# Patient Record
Sex: FEMALE | Race: WHITE | URBAN - METROPOLITAN AREA
[De-identification: names, ages, dates, MRNs, and addresses within clinical notes are randomized per-mention and may not be internally consistent; named-entity substitution may affect disease eponyms.]

---

## 2024-07-19 ENCOUNTER — OFFICE VISIT (OUTPATIENT)
Age: 59
End: 2024-07-19
Payer: COMMERCIAL

## 2024-07-19 VITALS
HEART RATE: 92 BPM | RESPIRATION RATE: 20 BRPM | TEMPERATURE: 98.7 F | HEIGHT: 66 IN | SYSTOLIC BLOOD PRESSURE: 141 MMHG | DIASTOLIC BLOOD PRESSURE: 96 MMHG | WEIGHT: 183.5 LBS | BODY MASS INDEX: 29.49 KG/M2

## 2024-07-19 DIAGNOSIS — Z12.31 SCREENING MAMMOGRAM FOR BREAST CANCER: ICD-10-CM

## 2024-07-19 DIAGNOSIS — Z11.59 NEED FOR HEPATITIS C SCREENING TEST: ICD-10-CM

## 2024-07-19 DIAGNOSIS — L82.1 SK (SEBORRHEIC KERATOSIS): ICD-10-CM

## 2024-07-19 DIAGNOSIS — R03.0 ELEVATED BLOOD PRESSURE READING IN OFFICE WITHOUT DIAGNOSIS OF HYPERTENSION: ICD-10-CM

## 2024-07-19 DIAGNOSIS — L91.8 SKIN TAG: ICD-10-CM

## 2024-07-19 DIAGNOSIS — Z00.00 ENCOUNTER FOR WELL ADULT EXAM WITHOUT ABNORMAL FINDINGS: Primary | ICD-10-CM

## 2024-07-19 DIAGNOSIS — Z78.9 NONSMOKER: ICD-10-CM

## 2024-07-19 DIAGNOSIS — Z12.11 ENCOUNTER FOR SCREENING COLONOSCOPY: ICD-10-CM

## 2024-07-19 DIAGNOSIS — Z11.4 SCREENING FOR HIV (HUMAN IMMUNODEFICIENCY VIRUS): ICD-10-CM

## 2024-07-19 PROCEDURE — 99212 OFFICE O/P EST SF 10 MIN: CPT | Performed by: FAMILY MEDICINE

## 2024-07-19 PROCEDURE — 99386 PREV VISIT NEW AGE 40-64: CPT | Performed by: FAMILY MEDICINE

## 2024-07-19 RX ORDER — M-VIT,TX,IRON,MINS/CALC/FOLIC 27MG-0.4MG
1 TABLET ORAL DAILY
COMMUNITY

## 2024-07-19 NOTE — PATIENT INSTRUCTIONS
Please have labs completed at earliest convenience.  It would be best if you could fast for 8 hours, but if you don't, no big deal.  Please schedule a pap.  You can have the skin tag removed when you desire, but you will likely need to pay out of pocket for it.  Mole is benign, non concerning, called a seborrheic keratosis.  Surgery referral for colonoscopy  Mammogram ordered.  Please try to eat a healthy, well balanced diet.  Try to eat foods along the perimeter of the grocery store, like fruit, veggies, whole grains, lean protein (chicken, fish, and turkey).  May consider increasing protein intake, and getting 2 servings of veggies with each meal.  Start with one small change at a time and continue to work on additional changes.  For example, start by making sure that you are getting 2 servings of vegetables at dinner most nights.  Then, possibly increase to 2 servings of vegetables at lunch and 2 at dinner.  If you need suggestions, please ask me!  There are recommendations for regular exercise.  The minimal amount of moderate aerobic activity each week is 150 minutes.  This means that your heart rate is getting up a moderate amount.  The activities can be anything you find enjoyable, but some examples are running/jogging, walking at a moderate pace, biking, dancing in your living room, reagan, roller skating, going up/down steps in your home, etc.  There is also a recommendation for strength training for at least 30 minutes 2 times per week.  This can be done at home with your own body weight or going to the gym, or using youtube videos.  Start with a small goal, like walking 15 minutes 5 days per week and building from there.  If you have questions, please ask me!    Main goal to work on is increasing protein and water and adding strength training 2 times per week for at least 20-30 min each session.  Then building from there.  Please follow-up blood pressure and pap.

## 2024-07-19 NOTE — PROGRESS NOTES
[facility]  [address] [place] [address]  Phone: [phone]  Fax: [phone]      CHIEF COMPLAINT:     Leonora Mello is a 58 y.o. female for an annual wellness exam.    HPI     Subjective:  She has been feeling fine and has had no new problems since last office visit. She does not see any other doctors. Used to see RAMA Mehta.     BP is 143/92. She has never been diagnosed with HTN. Denies headaches, CP, palpitations, SOB, vision changes.     She is concerned about a mole on her back, has been there a long time. Has started to change color and size a few months ago. She has had several moles removed in the past, all were benign. Denies family hx skin cancer. Does not wear sunscreen regularly.    She has a skin tag on her neck she is interested in having removed. Gets caught on necklaces.        REVIEWED INFORMATION    I personally reviewed the patient's past medical history, current medications, allergies, surgical history, family history and social history.  Updates were made as necessary.     Past Medical History:   Diagnosis Date    No pertinent past medical history      Past Surgical History:   Procedure Laterality Date    COLONOSCOPY  2014     Sulfa antibiotics  Social History     Tobacco Use    Smoking status: Never    Smokeless tobacco: Never   Substance Use Topics    Alcohol use: Not Currently     Comment: socially     Family History   Problem Relation Age of Onset    Pacemaker Mother     Stroke Father     High Cholesterol Father     High Blood Pressure Father     Breast Cancer Maternal Aunt        Gynecologic History:  Menarche: unsure of age    Menstrual history:    No LMP recorded. Menopausal for 11 years.     Sexually Active: Yes    Sexual dysfunction concerns: No     STI History: Yes      Permanent Sterilization: No   Reversible Birth Control: No        Hormone Replacement Exposure: No      Vasomotor and/or

## 2024-07-25 ENCOUNTER — HOSPITAL ENCOUNTER (OUTPATIENT)
Age: 59
Setting detail: SPECIMEN
Discharge: HOME | End: 2024-07-25

## 2024-07-25 DIAGNOSIS — Z11.4 SCREENING FOR HIV (HUMAN IMMUNODEFICIENCY VIRUS): ICD-10-CM

## 2024-07-25 DIAGNOSIS — Z00.00 ENCOUNTER FOR WELL ADULT EXAM WITHOUT ABNORMAL FINDINGS: ICD-10-CM

## 2024-07-25 DIAGNOSIS — Z11.59 NEED FOR HEPATITIS C SCREENING TEST: ICD-10-CM

## 2024-07-25 LAB
ALBUMIN SERPL-MCNC: 5 G/DL (ref 3.5–5.2)
ALBUMIN/GLOB SERPL: 2 {RATIO} (ref 1–2.5)
ALBUMIN: NORMAL
ALP BLD-CCNC: NORMAL U/L
ALP SERPL-CCNC: 93 U/L (ref 35–104)
ALT SERPL-CCNC: 16 U/L (ref 10–35)
ALT SERPL-CCNC: NORMAL U/L
ANION GAP SERPL CALCULATED.3IONS-SCNC: 11 MMOL/L (ref 9–16)
AST SERPL-CCNC: 15 U/L (ref 10–35)
AST SERPL-CCNC: NORMAL U/L
BASOPHILS # BLD: 0.04 K/UL (ref 0–0.2)
BASOPHILS NFR BLD: 1 % (ref 0–2)
BILIRUB SERPL-MCNC: 0.8 MG/DL (ref 0–1.2)
BILIRUB SERPL-MCNC: NORMAL MG/DL
BUN BLDV-MCNC: NORMAL MG/DL
BUN SERPL-MCNC: 19 MG/DL (ref 8–23)
CALCIUM SERPL-MCNC: 9.9 MG/DL (ref 8.6–10.4)
CALCIUM SERPL-MCNC: NORMAL MG/DL
CHLORIDE BLD-SCNC: NORMAL MMOL/L
CHLORIDE SERPL-SCNC: 102 MMOL/L (ref 98–107)
CHOLEST SERPL-MCNC: 249 MG/DL (ref 0–199)
CHOLESTEROL/HDL RATIO: 4
CO2 SERPL-SCNC: 26 MMOL/L (ref 20–31)
CO2: NORMAL
CREAT SERPL-MCNC: 0.9 MG/DL (ref 0.5–0.9)
CREAT SERPL-MCNC: NORMAL MG/DL
EOSINOPHIL # BLD: 0.05 K/UL (ref 0–0.44)
EOSINOPHILS RELATIVE PERCENT: 1 % (ref 1–4)
ERYTHROCYTE [DISTWIDTH] IN BLOOD BY AUTOMATED COUNT: 12.9 % (ref 11.8–14.4)
GFR, ESTIMATED: 77 ML/MIN/1.73M2
GLUCOSE BLD-MCNC: 105 MG/DL
GLUCOSE FASTING: 105 MG/DL
GLUCOSE SERPL-MCNC: 105 MG/DL (ref 74–99)
HCT VFR BLD AUTO: 42.1 % (ref 36.3–47.1)
HCV AB SERPL QL IA: NONREACTIVE
HDLC SERPL-MCNC: 57 MG/DL
HGB BLD-MCNC: 14 G/DL (ref 11.9–15.1)
HIV 1+2 AB+HIV1 P24 AG SERPL QL IA: NONREACTIVE
IMM GRANULOCYTES # BLD AUTO: <0.03 K/UL (ref 0–0.3)
IMM GRANULOCYTES NFR BLD: 0 %
LDLC SERPL CALC-MCNC: 176 MG/DL (ref 0–100)
LYMPHOCYTES NFR BLD: 1.9 K/UL (ref 1.1–3.7)
LYMPHOCYTES RELATIVE PERCENT: 34 % (ref 24–43)
MCH RBC QN AUTO: 32.5 PG (ref 25.2–33.5)
MCHC RBC AUTO-ENTMCNC: 33.3 G/DL (ref 28.4–34.8)
MCV RBC AUTO: 97.7 FL (ref 82.6–102.9)
MONOCYTES NFR BLD: 0.38 K/UL (ref 0.1–1.2)
MONOCYTES NFR BLD: 7 % (ref 3–12)
NEUTROPHILS NFR BLD: 57 % (ref 36–65)
NEUTS SEG NFR BLD: 3.23 K/UL (ref 1.5–8.1)
NRBC BLD-RTO: 0 PER 100 WBC
PLATELET # BLD AUTO: 284 K/UL (ref 138–453)
PMV BLD AUTO: 11.8 FL (ref 8.1–13.5)
POTASSIUM SERPL-SCNC: 4.6 MMOL/L (ref 3.7–5.3)
POTASSIUM SERPL-SCNC: NORMAL MMOL/L
PROT SERPL-MCNC: 7.6 G/DL (ref 6.6–8.7)
RBC # BLD AUTO: 4.31 M/UL (ref 3.95–5.11)
SODIUM BLD-SCNC: NORMAL MMOL/L
SODIUM SERPL-SCNC: 139 MMOL/L (ref 136–145)
TOTAL PROTEIN: NORMAL
TRIGL SERPL-MCNC: 80 MG/DL
VLDLC SERPL CALC-MCNC: 16 MG/DL
WBC OTHER # BLD: 5.6 K/UL (ref 3.5–11.3)

## 2024-07-29 ENCOUNTER — TELEPHONE (OUTPATIENT)
Age: 59
End: 2024-07-29

## 2024-07-29 ENCOUNTER — HOSPITAL ENCOUNTER (OUTPATIENT)
Age: 59
Setting detail: SPECIMEN
Discharge: HOME | End: 2024-07-29

## 2024-07-29 ENCOUNTER — OFFICE VISIT (OUTPATIENT)
Age: 59
End: 2024-07-29
Payer: COMMERCIAL

## 2024-07-29 VITALS
BODY MASS INDEX: 29.05 KG/M2 | SYSTOLIC BLOOD PRESSURE: 146 MMHG | WEIGHT: 180 LBS | HEART RATE: 88 BPM | RESPIRATION RATE: 20 BRPM | DIASTOLIC BLOOD PRESSURE: 89 MMHG

## 2024-07-29 DIAGNOSIS — Z78.9 NONSMOKER: ICD-10-CM

## 2024-07-29 DIAGNOSIS — Z01.419 ENCOUNTER FOR WELL WOMAN EXAM WITH ROUTINE GYNECOLOGICAL EXAM: Primary | ICD-10-CM

## 2024-07-29 DIAGNOSIS — R03.0 ELEVATED BLOOD PRESSURE READING IN OFFICE WITHOUT DIAGNOSIS OF HYPERTENSION: ICD-10-CM

## 2024-07-29 PROCEDURE — 99212 OFFICE O/P EST SF 10 MIN: CPT | Performed by: FAMILY MEDICINE

## 2024-07-29 PROCEDURE — 99396 PREV VISIT EST AGE 40-64: CPT | Performed by: FAMILY MEDICINE

## 2024-07-29 SDOH — ECONOMIC STABILITY: FOOD INSECURITY: WITHIN THE PAST 12 MONTHS, THE FOOD YOU BOUGHT JUST DIDN'T LAST AND YOU DIDN'T HAVE MONEY TO GET MORE.: NEVER TRUE

## 2024-07-29 SDOH — ECONOMIC STABILITY: FOOD INSECURITY: WITHIN THE PAST 12 MONTHS, YOU WORRIED THAT YOUR FOOD WOULD RUN OUT BEFORE YOU GOT MONEY TO BUY MORE.: NEVER TRUE

## 2024-07-29 SDOH — ECONOMIC STABILITY: HOUSING INSECURITY
IN THE LAST 12 MONTHS, WAS THERE A TIME WHEN YOU DID NOT HAVE A STEADY PLACE TO SLEEP OR SLEPT IN A SHELTER (INCLUDING NOW)?: NO

## 2024-07-29 SDOH — ECONOMIC STABILITY: INCOME INSECURITY: HOW HARD IS IT FOR YOU TO PAY FOR THE VERY BASICS LIKE FOOD, HOUSING, MEDICAL CARE, AND HEATING?: NOT HARD AT ALL

## 2024-07-29 ASSESSMENT — PATIENT HEALTH QUESTIONNAIRE - PHQ9
SUM OF ALL RESPONSES TO PHQ9 QUESTIONS 1 & 2: 0
SUM OF ALL RESPONSES TO PHQ QUESTIONS 1-9: 0
2. FEELING DOWN, DEPRESSED OR HOPELESS: NOT AT ALL
1. LITTLE INTEREST OR PLEASURE IN DOING THINGS: NOT AT ALL
SUM OF ALL RESPONSES TO PHQ QUESTIONS 1-9: 0

## 2024-07-29 NOTE — PATIENT INSTRUCTIONS
Please continue to take blood pressure at home 2 times per week.  Try to follow the healthy diet we discussed today.  I think it's a great plan.  We are really looking for the least amount of processed food.  Paps can take at least 1 week to come back.  I'll send a message when I have it.  Follow up in 8 weeks for blood pressure.                          Resources:   https://www.precisionnutrition.com/what-should-i-eat-infographic  https://www.precisionnutrition.com/calorie-control-guide-infographic

## 2024-07-29 NOTE — PROGRESS NOTES
Chloride 07/25/2024 102  98 - 107 mmol/L Final    CO2 07/25/2024 26  20 - 31 mmol/L Final    Anion Gap 07/25/2024 11  9 - 16 mmol/L Final    Glucose 07/25/2024 105 (H)  74 - 99 mg/dL Final    BUN 07/25/2024 19  8 - 23 mg/dL Final    Creatinine 07/25/2024 0.9  0.50 - 0.90 mg/dL Final    Est, Glom Filt Rate 07/25/2024 77  >60 mL/min/1.73m2 Final    Comment:       These results are not intended for use in patients <18 years of age.        eGFR results are calculated without a race factor using the 2021 CKD-EPI equation.  Careful clinical correlation is recommended, particularly when comparing to results   calculated using previous equations.  The CKD-EPI equation is less accurate in patients with extremes of muscle mass, extra-renal   metabolism of creatine, excessive creatine ingestion, or following therapy that affects   renal tubular secretion.      Calcium 07/25/2024 9.9  8.6 - 10.4 mg/dL Final    Total Protein 07/25/2024 7.6  6.6 - 8.7 g/dL Final    Albumin 07/25/2024 5.0  3.5 - 5.2 g/dL Final    Albumin/Globulin Ratio 07/25/2024 2.0  1.0 - 2.5 Final    Total Bilirubin 07/25/2024 0.8  0.00 - 1.20 mg/dL Final    Alkaline Phosphatase 07/25/2024 93  35 - 104 U/L Final    ALT 07/25/2024 16  10 - 35 U/L Final    AST 07/25/2024 15  10 - 35 U/L Final    WBC 07/25/2024 5.6  3.5 - 11.3 k/uL Final    RBC 07/25/2024 4.31  3.95 - 5.11 m/uL Final    Hemoglobin 07/25/2024 14.0  11.9 - 15.1 g/dL Final    Hematocrit 07/25/2024 42.1  36.3 - 47.1 % Final    MCV 07/25/2024 97.7  82.6 - 102.9 fL Final    MCH 07/25/2024 32.5  25.2 - 33.5 pg Final    MCHC 07/25/2024 33.3  28.4 - 34.8 g/dL Final    RDW 07/25/2024 12.9  11.8 - 14.4 % Final    Platelets 07/25/2024 284  138 - 453 k/uL Final    MPV 07/25/2024 11.8  8.1 - 13.5 fL Final    NRBC Automated 07/25/2024 0.0  0.0 per 100 WBC Final    Neutrophils % 07/25/2024 57  36 - 65 % Final    Lymphocytes % 07/25/2024 34  24 - 43 % Final    Monocytes % 07/25/2024 7  3 - 12 % Final

## 2024-07-30 LAB
HPV I/H RISK 4 DNA CVX QL NAA+PROBE: NOT DETECTED
HPV SAMPLE: NORMAL
HPV, INTERPRETATION: NORMAL
HPV16 DNA CVX QL NAA+PROBE: NOT DETECTED
HPV18 DNA CVX QL NAA+PROBE: NOT DETECTED
SPECIMEN DESCRIPTION: NORMAL

## 2024-08-06 ENCOUNTER — PROCEDURE VISIT (OUTPATIENT)
Age: 59
End: 2024-08-06
Payer: COMMERCIAL

## 2024-08-06 VITALS
DIASTOLIC BLOOD PRESSURE: 102 MMHG | WEIGHT: 177.4 LBS | RESPIRATION RATE: 12 BRPM | SYSTOLIC BLOOD PRESSURE: 154 MMHG | BODY MASS INDEX: 28.63 KG/M2 | HEART RATE: 77 BPM | TEMPERATURE: 97.6 F

## 2024-08-06 DIAGNOSIS — L91.8 CUTANEOUS SKIN TAGS: Primary | ICD-10-CM

## 2024-08-06 PROCEDURE — 11200 RMVL SKIN TAGS UP TO&INC 15: CPT | Performed by: FAMILY MEDICINE

## 2024-08-06 RX ORDER — LIDOCAINE HYDROCHLORIDE AND EPINEPHRINE 10; 10 MG/ML; UG/ML
1 INJECTION, SOLUTION INFILTRATION; PERINEURAL ONCE
Status: COMPLETED | OUTPATIENT
Start: 2024-08-06 | End: 2024-08-06

## 2024-08-06 RX ADMIN — LIDOCAINE HYDROCHLORIDE AND EPINEPHRINE 1 ML: 10; 10 INJECTION, SOLUTION INFILTRATION; PERINEURAL at 16:07

## 2024-08-06 NOTE — PROGRESS NOTES
Dermatology Procedure Note   [facility]  [address] [facility] [address]  Dept: [phone]  Dept Fax: [phone]      Procedure Date: 8/6/2024  Procedure Time: 3:51 PM    Procedure Practitioner: Andre Woods MD    Procedure: Lesion Destruction    Pre-Procedure Diagnosis: skin tag    Post-Procedure Diagnosis: Same as Pre-Procedure Diagnosis    Informed Consent: The procedure and its risks were explained including but not limited to pain, bleeding, infection, permanent scar, permanent pigment alteration and recurrence. Consent to proceed with the procedure was obtained from the patient by the practitioner.    Time Out:  A time out was conducted immediately before starting the procedure that confirmed a final verification of the correct patient, correct procedure, and correct site.    Procedure Details: The  patient was prepped and draped in the normal sterile fashion with Betadine.  Then 1 cc of lidocaine with epinephrine was injected.  After good anesthesia was achieved, skin tag was removed with scissors.  Area was cauterized with Drysol.  No blood loss.  Patient handled the procedure without incident and was discharged in stable condition.      Procedure Performed By: Andre Woods MD    Estimated Blood Loss: Minimal    Pathologic Specimen:  none    Procedure Tolerance: Good    Complication(s): None    Electronically signed by Andre Woods MD on 8/6/24 at 1:34 PM EDT

## 2024-08-06 NOTE — PATIENT INSTRUCTIONS
It was a pleasure to see you today.    I would treat this area as if it was a skin scrape.  You may leave the Band-Aid on for today.  If you note any bleeding, redness, pus, swelling or pain, feel free to give us a call at the office.  Otherwise follow-up as scheduled with your regular provider.

## 2024-08-07 LAB — CYTOLOGY REPORT: NORMAL

## 2024-08-30 ENCOUNTER — HOSPITAL ENCOUNTER (OUTPATIENT)
Dept: MAMMOGRAPHY | Age: 59
Discharge: HOME | End: 2024-08-30
Attending: FAMILY MEDICINE
Payer: COMMERCIAL

## 2024-08-30 VITALS — BODY MASS INDEX: 28.45 KG/M2 | WEIGHT: 177 LBS | HEIGHT: 66 IN

## 2024-08-30 DIAGNOSIS — Z12.31 SCREENING MAMMOGRAM FOR BREAST CANCER: ICD-10-CM

## 2024-08-30 PROCEDURE — 77063 BREAST TOMOSYNTHESIS BI: CPT

## 2024-10-22 ENCOUNTER — HOSPITAL ENCOUNTER (OUTPATIENT)
Dept: WOMENS IMAGING | Age: 59
Discharge: HOME | End: 2024-10-24
Payer: COMMERCIAL

## 2024-10-22 DIAGNOSIS — R92.8 ABNORMAL MAMMOGRAM: ICD-10-CM

## 2024-10-22 PROCEDURE — G0279 TOMOSYNTHESIS, MAMMO: HCPCS

## 2024-10-22 PROCEDURE — 76642 ULTRASOUND BREAST LIMITED: CPT

## 2024-10-25 NOTE — RESULT ENCOUNTER NOTE
Message to patient:      Lukasz Lea,    Your mammogram and breast ultrasound showed cysts and glands (simply put).  No cancer.  Recommendation is to repeat in 1 year.    If you have questions, please let me know.  -Dr. Cope